# Patient Record
Sex: FEMALE | Race: WHITE
[De-identification: names, ages, dates, MRNs, and addresses within clinical notes are randomized per-mention and may not be internally consistent; named-entity substitution may affect disease eponyms.]

---

## 2017-07-17 NOTE — OR
DATE OF PROCEDURE:  07/17/2017

 

ASSISTANT:  None.

 

PREOPERATIVE DIAGNOSIS:  Arthritis with bunion left 1st metatarsophalangeal joint.

 

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

 

PROCEDURE:  Arthrodesis left 1st metatarsophalangeal joint.

 

ANESTHESIA:  Local with IV sedation.

 

HEMOSTASIS:  Obtained with an ankle tourniquet on the left ankle at 250 mmHg.

 

ESTIMATED BLOOD LOSS:  5 mL.

 

MATERIALS:  One short left-sided Arthrex 1st MPJ fusion plate was used, one 3.0 mm

cannulated screw was used 26 mm in length, and four 3.0 mm locking screws were used and one

3.0 mm nonlocking screw was used.

 

INJECTABLES:  20 mL of Marcaine 0.5% plain were injected preoperatively.

 

PATHOLOGY:  None.

 

CONDITION:  Stable.

 

INDICATIONS FOR SURGERY:  Painful arthritic bunion, left foot that was unresponsive to

conservative measures.

 

PROCEDURE IN DETAIL:  The patient was brought into the operating room, placed on the

operating table in supine position.  Following IV sedation, anesthesia was obtained with a

total of 20 mL of Marcaine 0.5% plain.  The left foot was scrubbed, prepped, and draped in

the usual aseptic manner, raised to 60 degrees for hemostasis and exsanguinated using

Esmarch bandage.  Tourniquet was inflated.  Foot was lowered to the table and skin incision

was made on dorsal medial aspect of the left 1st metatarsophalangeal joint.  Incisions were

deepened through subcutaneous tissues with care taken to identify and retract all vital

neurovascular structures.  Following subcutaneous dissection, a T-type incision was made

into the 1st metatarsophalangeal joint capsule of the left foot.  The capsule was carefully

dissected off the 1st metatarsal head and the base of the proximal phalanx and then the

sagittal saw was used to remove the medial eminence on the 1st metatarsal head and also the

dorsal eminence on the 1st metatarsal head.  It was noted that there was approximately 25%

of articular cartilage missing from the 1st metatarsal head, so the Arthrex reamers were

then used to remove the remaining cartilage in the subchondral plate both on the 1st

metatarsal and the base of the proximal phalanx and then 0.062 K-wire was used to fenestrate

the 1st metatarsal head and the base of the proximal phalanx.  They were approximated and

then fixated with a plate and 3.0 mm cannulated screw was used for the 1st fixation with the

plate only fixated in the proximal end.  The position of the great toe was checked

fluoroscopically and then anatomically and found to be anatomic, so then the rest of the

screws were placed in the plate and incision was flushed out with copious amounts of sterile

saline.  Capsular closure was done with 3-0 Vicryl, subcutaneous closure with 3-0 Vicryl,

and skin closure with 4-0 nylon.  Foot was dressed with Xeroform, 4x4s, Kerlix, Coban.  The

patient was returned to the recovery room with vital signs stable and vascular status intact

to both feet.  The patient was told to return to clinic for followup in one week at which

time, she will be re-evaluated, maintain strict nonweightbearing, keep dressings clean, dry,

and intact and go to the emergency room immediately if she has any nausea, vomiting, fever,

chills, chest pain, calf pain, or difficulty breathing.  The patient was agreeable to this.

The patient was also placed on Percocet 5/325 one tab p.o. q.4-6 hours p.r.n. pain and

Vistaril 25 mg one tab p.o. q.8 hours p.r.n. pain, dispensed #30.

 

 

 

 

Moise Rosado DPM

DD:  07/17/2017 13:45:35

DT:  07/17/2017 21:29:50

Job #:  [id number]

## 2020-02-25 ENCOUNTER — HOSPITAL ENCOUNTER (OUTPATIENT)
Dept: HOSPITAL 11 - JP.SDS | Age: 70
Discharge: HOME | End: 2020-02-25
Attending: INTERNAL MEDICINE
Payer: MEDICARE

## 2020-02-25 VITALS — SYSTOLIC BLOOD PRESSURE: 144 MMHG | DIASTOLIC BLOOD PRESSURE: 63 MMHG

## 2020-02-25 VITALS — HEART RATE: 64 BPM

## 2020-02-25 DIAGNOSIS — E78.5: ICD-10-CM

## 2020-02-25 DIAGNOSIS — Z86.010: ICD-10-CM

## 2020-02-25 DIAGNOSIS — I10: ICD-10-CM

## 2020-02-25 DIAGNOSIS — J44.9: ICD-10-CM

## 2020-02-25 DIAGNOSIS — Z12.11: Primary | ICD-10-CM

## 2020-02-25 DIAGNOSIS — F17.200: ICD-10-CM

## 2020-02-25 NOTE — PROC
DATE OF PROCEDURE:  02/25/2020

 

SURGEON:  Keyshawn Unger MD

 

PROCEDURE:  Colonoscopy.

 

PREPROCEDURE DIAGNOSIS:  History of colon polyps.

 

POSTPROCEDURE DIAGNOSES:

1. History of colon polyps.

2. Normal colonoscopy.

 

DESCRIPTION OF PROCEDURE:  Risks and goals of procedure reviewed with the patient.  She gave

informed consent to proceed.  She was brought back to the endoscopy room.  Sedation and

monitoring provided by Yahir Cruz from the Anesthesia Service.  A time-out was held

prior to the procedure to confirm right site, right patient, and right procedure, also to

identify any potential concerns regarding the procedure, of which there were none.  She was

placed in a left lateral decubitus position.  After adequate sedation was achieved, digital

rectal exam was performed, which was unremarkable.  Following this, flexible colonoscope was

placed and advanced out through the colon to the cecum.  The cecum was identified based on

the appendiceal orifice and ileocecal valve.  Scope was then slowly withdrawn through the

entire length of the colon to the rectum where it was retroflexed, straightened, and

removed.  There were no significant mucosal polyps, masses, or lesions seen, and the

procedure was, otherwise, completed without complication.  She will be monitored in PAR in

outpatient area until fully recovered from IV sedation and discharged to home.

 

 

 

 

Keyshawn Unger MD

DD:  02/25/2020 11:16:00

DT:  02/25/2020 13:56:31

Job #:  [id number]

## 2021-11-05 ENCOUNTER — HOSPITAL ENCOUNTER (EMERGENCY)
Dept: HOSPITAL 11 - JP.ED | Age: 71
Discharge: HOME | End: 2021-11-05
Payer: MEDICARE

## 2021-11-05 VITALS — HEART RATE: 91 BPM | SYSTOLIC BLOOD PRESSURE: 139 MMHG | DIASTOLIC BLOOD PRESSURE: 79 MMHG

## 2021-11-05 DIAGNOSIS — I10: ICD-10-CM

## 2021-11-05 DIAGNOSIS — Z88.5: ICD-10-CM

## 2021-11-05 DIAGNOSIS — Z79.899: ICD-10-CM

## 2021-11-05 DIAGNOSIS — Z88.8: ICD-10-CM

## 2021-11-05 DIAGNOSIS — J42: ICD-10-CM

## 2021-11-05 DIAGNOSIS — F17.200: ICD-10-CM

## 2021-11-05 DIAGNOSIS — J20.9: Primary | ICD-10-CM

## 2021-11-05 DIAGNOSIS — Z79.82: ICD-10-CM

## 2021-11-05 DIAGNOSIS — E78.00: ICD-10-CM

## 2021-11-05 DIAGNOSIS — Z20.822: ICD-10-CM

## 2021-11-05 LAB — SARS-COV-2 RNA RESP QL NAA+PROBE: NEGATIVE

## 2021-11-05 PROCEDURE — 85025 COMPLETE CBC W/AUTO DIFF WBC: CPT

## 2021-11-05 PROCEDURE — 0241U: CPT

## 2021-11-05 PROCEDURE — 94640 AIRWAY INHALATION TREATMENT: CPT

## 2021-11-05 PROCEDURE — 99285 EMERGENCY DEPT VISIT HI MDM: CPT

## 2021-11-05 PROCEDURE — 71046 X-RAY EXAM CHEST 2 VIEWS: CPT

## 2021-11-05 PROCEDURE — 86140 C-REACTIVE PROTEIN: CPT

## 2021-11-05 PROCEDURE — 80053 COMPREHEN METABOLIC PANEL: CPT

## 2021-11-05 PROCEDURE — 36415 COLL VENOUS BLD VENIPUNCTURE: CPT

## 2021-11-05 NOTE — EDM.PDOC
ED HPI GENERAL MEDICAL PROBLEM





- General


Chief Complaint: Respiratory Problem


Stated Complaint: CHEST HURTS, COUGH


Time Seen by Provider: 11/05/21 18:01


Source of Information: Reports: Patient, Family ()


History Limitations: Reports: No Limitations





- History of Present Illness


INITIAL COMMENTS - FREE TEXT/NARRATIVE: 


Naheed is a 71-year-old female presenting to the ED for evaluation of chills, 

cough, shortness of breath that started several days ago.  Patient reports that 

she had fever and chills on Wednesday night (2 days ago and started to 

experience some shortness of breath.  The cough has been loose but minimally 

productive.  Patient does smoke 1 pack/day and has a 55-pack-year history of 

smoking.  She has been vaccinated for Covid and for pneumonia with the P

neumovax.  She does not report having a fever now but she is flushed and feels 

very warm stating that the room is hot.








- Related Data


                                    Allergies











Allergy/AdvReac Type Severity Reaction Status Date / Time


 


hydroxyzine Allergy  Nausea Verified 11/05/21 16:47


 


tramadol Allergy  Nausea Verified 11/05/21 16:47


 


hydrocodone AdvReac  Vomiting Verified 11/05/21 16:47











Home Meds: 


                                    Home Meds





Aspirin 162.5 mg PO DAILY 02/09/17 [History]


Calcium Carbonate [Calcium] 600 mg PO DAILY 02/09/17 [History]


Lovastatin [Mevacor] 20 mg PO DAILY 02/09/17 [History]


Triamterene/Hydrochlorothiazid [Triamterene-HCTZ 37.5-25 MG] 1.5 cap PO DAILY 

02/09/17 [History]


dilTIAZem HCL [Diltiazem ER] 180 mg PO DAILY 02/09/17 [History]











Past Medical History


HEENT History: Reports: Cataract, Impaired Vision


Cardiovascular History: Reports: High Cholesterol, Hypertension


Respiratory History: Reports: Bronchitis, Recurrent, Pneumonia, Recurrent


Gastrointestinal History: Reports: Colon Polyp


Genitourinary History: Reports: None


OB/GYN History: Reports: Pregnancy


Musculoskeletal History: Reports: Arthritis, Other (See Below)


Other Musculoskeletal History: arthritis left 1st metatarsophalangeal join, 

bunion deformity left foot


Neurological History: Reports: None


Psychiatric History: Reports: None


Endocrine/Metabolic History: Reports: None


Hematologic History: Reports: None


Oncologic (Cancer) History: Reports: None


Dermatologic History: Reports: Psoriasis, Other (See Below)


Other Dermatologic History: lesion removed from nose





- Infectious Disease History


Infectious Disease History: Reports: Chicken Pox





- Past Surgical History


Head Surgeries/Procedures: Reports: None


HEENT Surgical History: Reports: Oral Surgery, Tonsillectomy


Cardiovascular Surgical History: Reports: None


Respiratory Surgical History: Reports: None


GI Surgical History: Reports: Colonoscopy


Female  Surgical History: Reports: Breast Biopsy, D&C, Hysterectomy, 

Salpingo-Oophorectomy


Musculoskeletal Surgical History: Reports: Carpal Tunnel, Other (See Below)


Other Musculoskeletal Surgeries/Procedures:: left thumb surgery     trigger 

thumb


Oncologic Surgical History: Reports: Biopsy of Breast


Dermatological Surgical History: Reports: None





Social & Family History





- Family History


Family Medical History: No Pertinent Family History





- Tobacco Use


Tobacco Use Status *Q: Current Every Day Tobacco User


Years of Tobacco use: 54


Packs/Tins Daily: 0.5





- Caffeine Use


Caffeine Use: Reports: Soda





- Recreational Drug Use


Recreational Drug Use: No





ED ROS GENERAL





- Review of Systems


Review Of Systems: See Below


Constitutional: Reports: Chills


HEENT: Reports: No Symptoms


Respiratory: Reports: Shortness of Breath, Wheezing, Cough, Sputum


Cardiovascular: Reports: Chest Pain (Chest heaviness)


Endocrine: Reports: No Symptoms


GI/Abdominal: Reports: No Symptoms


: Reports: No Symptoms


Musculoskeletal: Reports: No Symptoms


Skin: Reports: No Symptoms


Neurological: Reports: No Symptoms


Psychiatric: Reports: Anxiety


Hematologic/Lymphatic: Reports: No Symptoms


Immunologic: Reports: No Symptoms





ED EXAM, GENERAL





- Physical Exam


Exam: See Below


Exam Limited By: No Limitations


General Appearance: Anxious, Moderate Distress


Eye Exam: Left Eye: EOMI, PERRL


Nose: Normal Inspection, Normal Mucosa


Throat/Mouth: Normal Inspection, Normal Oropharynx, Normal Voice, No Airway 

Compromise


Head: Atraumatic, Normocephalic


Neck: Normal Inspection, Supple.  No: Lymphadenopathy (R), Lymphadenopathy (L)


Respiratory/Chest: No Accessory Muscle Use, Decreased Breath Sounds (Christina 

diminished breath sounds in the bases), Wheezing (Inspiratory and expiratory 

wheezes), Other (Tachypnea)


Cardiovascular: Normal Peripheral Pulses, Regular Rate, Rhythm, No Murmur


Peripheral Pulses: 2+: Radial (L), Radial (R)


GI/Abdominal: Normal Bowel Sounds, Soft, Non-Tender.  No: Guarding, Rebound


Back Exam: Normal Inspection, Full Range of Motion


Extremities: Normal Inspection, Normal Range of Motion


Neurological: Alert, Oriented, Normal Cognition, No Motor/Sensory Deficits


Psychiatric: Anxious


Skin Exam: Warm, Dry, Intact, No Rash, Pallor





Course





- Vital Signs


Last Recorded V/S: 


                                Last Vital Signs











Temp  36.6 C   11/05/21 16:43


 


Pulse  89   11/05/21 16:43


 


Resp  18   11/05/21 16:43


 


BP  165/85 H  11/05/21 16:43


 


Pulse Ox  93 L  11/05/21 16:43














- Orders/Labs/Meds


Orders: 


                               Active Orders 24 hr











 Category Date Time Status


 


 RT Aerosol Therapy [RC] ASDIRECTED Care  11/05/21 18:09 Active


 


 Chest 2V [CR] Stat Exams  11/05/21 18:03 Taken


 


 Isolation [COMM] Stat Oth  11/05/21 16:05 Ordered











Labs: 


                                Laboratory Tests











  11/05/21 11/05/21 11/05/21 Range/Units





  16:33 18:20 18:20 


 


WBC   15.4 H   (4.5-11.0)  K/uL


 


RBC   4.18   (3.30-5.50)  M/uL


 


Hgb   13.8   (12.0-15.0)  g/dL


 


Hct   40.7   (36.0-48.0)  %


 


MCV   97   (80-98)  fL


 


MCH   33 H   (27-31)  pg


 


MCHC   34   (32-36)  %


 


Plt Count   303   (150-400)  K/uL


 


Neut % (Auto)   80.9 H   (36-66)  %


 


Lymph % (Auto)   10.5 L   (24-44)  %


 


Mono % (Auto)   8.3 H   (2-6)  %


 


Eos % (Auto)   0.1 L   (2-4)  %


 


Baso % (Auto)   0.2   (0-1)  %


 


Sodium    133 L  (140-148)  mmol/L


 


Potassium    2.9 L*  (3.6-5.2)  mmol/L


 


Chloride    91 L  (100-108)  mmol/L


 


Carbon Dioxide    28  (21-32)  mmol/L


 


Anion Gap    16.9 H  (5.0-14.0)  mmol/L


 


BUN    11  (7-18)  mg/dL


 


Creatinine    0.8  (0.6-1.0)  mg/dL


 


Est Cr Clr Drug Dosing    46.19  mL/min


 


Estimated GFR (MDRD)    > 60  (>60)  


 


Glucose    114 H  ()  mg/dL


 


Calcium    9.8  (8.5-10.1)  mg/dL


 


Total Bilirubin    0.4  (0.2-1.0)  mg/dL


 


AST    16  (15-37)  U/L


 


ALT    16  (12-78)  U/L


 


Alkaline Phosphatase    89  ()  U/L


 


C-Reactive Protein    21.81 H  (0.0-0.3)  mg/dL


 


Total Protein    7.8  (6.4-8.2)  g/dL


 


Albumin    3.7  (3.4-5.0)  g/dL


 


Globulin    4.1 H  (2.3-3.5)  g/dL


 


Albumin/Globulin Ratio    0.9 L  (1.2-2.2)  


 


Influenza Type A RNA  Negative    (NEGATIVE)  


 


RSV RNA (INAAT)  Negative    (NEGATIVE)  


 


Influenza Type B RNA  Negative    (NEGATIVE)  


 


SARS-CoV-2 RNA (NATALIE)  Negative    (NEGATIVE)  











Meds: 


Medications














Discontinued Medications














Generic Name Dose Route Start Last Admin





  Trade Name Freq  PRN Reason Stop Dose Admin


 


Albuterol/Ipratropium  3 ml  11/05/21 18:09 





  Albuterol/Ipratropium 3.0-0.5 Mg/3 Ml Neb Soln  NEB  11/05/21 18:10 





  ONETIME ONE  


 


Potassium Chloride  40 meq  11/05/21 19:05 





  Potassium Chloride 20 Meq Tab.Er  PO  11/05/21 19:06 





  ONETIME ONE  














- Radiology Interpretation


Free Text/Narrative:: 


I reviewed the chest x-ray showing hyperinflation with flattening of the 

diaphragms.  There are no evidence for acute infiltrates or hilar adenopathy.








- Re-Assessments/Exams


Free Text/Narrative Re-Assessment/Exam: 





11/05/21 19:42 I reviewed the patient's labs showing a leukocyte count of 15.4, 

hemoglobin of 13.8, hematocrit of 40.7 and a platelet count of 303,000.  The 

patient's comprehensive metabolic panel shows sodium 133, potassium 2.9, 

chloride of 91, bicarbonate of 28, BUN of 11 with a creatinine of 0.8 and a 

glucose of 114.  The remainder of the comprehensive panel was unremarkable.  

Patient was given potassium 40 mEq by mouth for the hypokalemia.  Her C-reactive

 protein is 21.81.  She is Covid negative influenza negative and RSV negative.  

Her symptoms are consistent with an acute on chronic bronchitis.  We will treat 

her with azithromycin Z-Delvin, and albuterol inhaler, and a 12-day prednisone 

taper.  Indications return to the ED were discussed.  All questions were 

answered and she was discharged in satisfactory condition.








Departure





- Departure


Time of Disposition: 19:36


Disposition: Home, Self-Care 01


Clinical Impression: 


 Acute exacerbation of chronic bronchitis, Tobacco abuse





COPD (chronic obstructive pulmonary disease)


Qualifiers:


 COPD type: chronic bronchitis Chronic bronchitis type: unspecified Qualified 

Code(s): J42 - Unspecified chronic bronchitis








- Discharge Information


Instructions:  Chronic Obstructive Pulmonary Disease, Easy-to-Read, Acute 

Bronchitis, Adult, Easy-to-Read


Referrals: 


Deepak Bradford MD [Primary Care Provider] - 


Forms:  ED Department Discharge


Care Plan Goals: 


Your work-up today has shown that you have a recurrence of your chronic 

bronchitis causing inflammation of your airways and making it more difficult for

you to breathe we are going to put you on azithromycin Z-Delvin.  Please take as 

directed.  I am also prescribing you a 12-day prednisone taper to reduce 

inflammation in the airways and an albuterol inhaler.  These all been sent out 

to the [employer] machine so you may start them tonight.





Sepsis Event Note (ED)





- Focused Exam


Vital Signs: 


                                   Vital Signs











  Temp Pulse Resp BP Pulse Ox


 


 11/05/21 16:43  36.6 C  89  18  165/85 H  93 L


 


 11/05/21 16:33  36.6 C  89  18  165/85 H  93 L














- Problem List & Annotations


(1) Acute exacerbation of chronic bronchitis


SNOMED Code(s): 564088461


   Code(s): J20.9 - ACUTE BRONCHITIS, UNSPECIFIED; J42 - UNSPECIFIED CHRONIC 

BRONCHITIS   Status: Acute   Priority: High   Current Visit: Yes   





(2) COPD (chronic obstructive pulmonary disease)


SNOMED Code(s): 18422945


   Code(s): J44.9 - CHRONIC OBSTRUCTIVE PULMONARY DISEASE, UNSPECIFIED   Status:

Acute   Priority: High   Current Visit: Yes   


Qualifiers: 


   COPD type: chronic bronchitis   Chronic bronchitis type: unspecified   

Qualified Code(s): J42 - Unspecified chronic bronchitis   





(3) Tobacco abuse


SNOMED Code(s): 075995383


   Code(s): Z72.0 - TOBACCO USE   Status: Acute   Priority: High   Current 

Visit: Yes   





- Problem List Review


Problem List Initiated/Reviewed/Updated: Yes





- My Orders


Last 24 Hours: 


My Active Orders





11/05/21 18:03


Chest 2V [CR] Stat 





11/05/21 18:09


RT Aerosol Therapy [RC] ASDIRECTED 














- Assessment/Plan


Last 24 Hours: 


My Active Orders





11/05/21 18:03


Chest 2V [CR] Stat 





11/05/21 18:09


RT Aerosol Therapy [RC] ASDIRECTED

## 2021-11-08 NOTE — CR
CHEST: 2 view

 

CLINICAL HISTORY:Cough

 

COMPARISON:None

 

FINDINGS:  The heart size, pulmonary vascularity and hilar structures are

normal. No infiltrate effusion or pneumothorax is seen. Lungs are emphysematous

 

IMPRESSION: No acute cardiopulmonary process.

 

Emphysematous changes

## 2022-03-24 ENCOUNTER — HOSPITAL ENCOUNTER (OUTPATIENT)
Dept: HOSPITAL 11 - JP.SDS | Age: 72
Discharge: HOME | End: 2022-03-24
Attending: OPHTHALMOLOGY
Payer: MEDICARE

## 2022-03-24 VITALS — DIASTOLIC BLOOD PRESSURE: 84 MMHG | HEART RATE: 97 BPM | SYSTOLIC BLOOD PRESSURE: 154 MMHG

## 2022-03-24 DIAGNOSIS — H25.11: Primary | ICD-10-CM

## 2022-09-14 ENCOUNTER — HOSPITAL ENCOUNTER (OUTPATIENT)
Dept: HOSPITAL 11 - JP.SDS | Age: 72
Discharge: HOME | End: 2022-09-14
Attending: ORTHOPAEDIC SURGERY
Payer: MEDICARE

## 2022-09-14 VITALS — DIASTOLIC BLOOD PRESSURE: 68 MMHG | HEART RATE: 64 BPM | SYSTOLIC BLOOD PRESSURE: 136 MMHG

## 2022-09-14 DIAGNOSIS — J44.9: ICD-10-CM

## 2022-09-14 DIAGNOSIS — Z88.6: ICD-10-CM

## 2022-09-14 DIAGNOSIS — Z79.899: ICD-10-CM

## 2022-09-14 DIAGNOSIS — I10: ICD-10-CM

## 2022-09-14 DIAGNOSIS — E78.5: ICD-10-CM

## 2022-09-14 DIAGNOSIS — Z88.5: ICD-10-CM

## 2022-09-14 DIAGNOSIS — G56.01: Primary | ICD-10-CM

## 2022-09-14 PROCEDURE — 85027 COMPLETE CBC AUTOMATED: CPT

## 2022-09-14 PROCEDURE — 64721 CARPAL TUNNEL SURGERY: CPT

## 2022-09-14 PROCEDURE — 36415 COLL VENOUS BLD VENIPUNCTURE: CPT

## 2022-09-14 PROCEDURE — 80053 COMPREHEN METABOLIC PANEL: CPT
